# Patient Record
Sex: MALE | Race: WHITE | NOT HISPANIC OR LATINO | Employment: FULL TIME | URBAN - METROPOLITAN AREA
[De-identification: names, ages, dates, MRNs, and addresses within clinical notes are randomized per-mention and may not be internally consistent; named-entity substitution may affect disease eponyms.]

---

## 2023-08-01 ENCOUNTER — HOSPITAL ENCOUNTER (EMERGENCY)
Facility: HOSPITAL | Age: 33
Discharge: HOME | End: 2023-08-02
Attending: STUDENT IN AN ORGANIZED HEALTH CARE EDUCATION/TRAINING PROGRAM
Payer: COMMERCIAL

## 2023-08-01 DIAGNOSIS — R53.1 GENERALIZED WEAKNESS: Primary | ICD-10-CM

## 2023-08-01 DIAGNOSIS — R07.9 CHEST PAIN: ICD-10-CM

## 2023-08-01 LAB
ALBUMIN SERPL-MCNC: 4.4 G/DL (ref 3.5–5)
ALBUMIN/GLOB SERPL: 1.6 RATIO (ref 1.1–2)
ALP SERPL-CCNC: 63 UNIT/L (ref 40–150)
ALT SERPL-CCNC: 19 UNIT/L (ref 0–55)
APPEARANCE UR: CLEAR
AST SERPL-CCNC: 20 UNIT/L (ref 5–34)
BACTERIA #/AREA URNS AUTO: NORMAL /HPF
BASOPHILS # BLD AUTO: 0.05 X10(3)/MCL
BASOPHILS NFR BLD AUTO: 0.7 %
BILIRUB UR QL STRIP.AUTO: NEGATIVE
BILIRUBIN DIRECT+TOT PNL SERPL-MCNC: 0.8 MG/DL
BUN SERPL-MCNC: 12.8 MG/DL (ref 8.9–20.6)
CALCIUM SERPL-MCNC: 9.1 MG/DL (ref 8.4–10.2)
CHLORIDE SERPL-SCNC: 104 MMOL/L (ref 98–107)
CK SERPL-CCNC: 172 U/L (ref 30–200)
CO2 SERPL-SCNC: 24 MMOL/L (ref 22–29)
COLOR UR: YELLOW
CREAT SERPL-MCNC: 1.27 MG/DL (ref 0.73–1.18)
EOSINOPHIL # BLD AUTO: 0.1 X10(3)/MCL (ref 0–0.9)
EOSINOPHIL NFR BLD AUTO: 1.5 %
ERYTHROCYTE [DISTWIDTH] IN BLOOD BY AUTOMATED COUNT: 11.3 % (ref 11.5–17)
FLUAV AG UPPER RESP QL IA.RAPID: NOT DETECTED
FLUBV AG UPPER RESP QL IA.RAPID: NOT DETECTED
GFR SERPLBLD CREATININE-BSD FMLA CKD-EPI: >60 MLS/MIN/1.73/M2
GLOBULIN SER-MCNC: 2.7 GM/DL (ref 2.4–3.5)
GLUCOSE SERPL-MCNC: 165 MG/DL (ref 74–100)
GLUCOSE UR QL STRIP.AUTO: NEGATIVE
HCT VFR BLD AUTO: 43.5 % (ref 42–52)
HGB BLD-MCNC: 14.9 G/DL (ref 14–18)
IMM GRANULOCYTES # BLD AUTO: 0.01 X10(3)/MCL (ref 0–0.04)
IMM GRANULOCYTES NFR BLD AUTO: 0.1 %
KETONES UR QL STRIP.AUTO: NEGATIVE
LEUKOCYTE ESTERASE UR QL STRIP.AUTO: NEGATIVE
LYMPHOCYTES # BLD AUTO: 2.92 X10(3)/MCL (ref 0.6–4.6)
LYMPHOCYTES NFR BLD AUTO: 42.4 %
MCH RBC QN AUTO: 30.8 PG (ref 27–31)
MCHC RBC AUTO-ENTMCNC: 34.3 G/DL (ref 33–36)
MCV RBC AUTO: 89.9 FL (ref 80–94)
MONOCYTES # BLD AUTO: 0.56 X10(3)/MCL (ref 0.1–1.3)
MONOCYTES NFR BLD AUTO: 8.1 %
NEUTROPHILS # BLD AUTO: 3.24 X10(3)/MCL (ref 2.1–9.2)
NEUTROPHILS NFR BLD AUTO: 47.2 %
NITRITE UR QL STRIP.AUTO: NEGATIVE
NRBC BLD AUTO-RTO: 0 %
PH UR STRIP.AUTO: 6 [PH]
PLATELET # BLD AUTO: 223 X10(3)/MCL (ref 130–400)
PMV BLD AUTO: 10 FL (ref 7.4–10.4)
POTASSIUM SERPL-SCNC: 4 MMOL/L (ref 3.5–5.1)
PROT SERPL-MCNC: 7.1 GM/DL (ref 6.4–8.3)
PROT UR QL STRIP.AUTO: NEGATIVE
RBC # BLD AUTO: 4.84 X10(6)/MCL (ref 4.7–6.1)
RBC #/AREA URNS AUTO: NORMAL /HPF
RBC UR QL AUTO: NEGATIVE
SARS-COV-2 RNA RESP QL NAA+PROBE: NOT DETECTED
SODIUM SERPL-SCNC: 138 MMOL/L (ref 136–145)
SP GR UR STRIP.AUTO: 1.01 (ref 1–1.03)
SQUAMOUS #/AREA URNS AUTO: NORMAL /HPF
TROPONIN I SERPL-MCNC: <0.01 NG/ML (ref 0–0.04)
TSH SERPL-ACNC: 1.08 UIU/ML (ref 0.35–4.94)
UROBILINOGEN UR STRIP-ACNC: 0.2
WBC # SPEC AUTO: 6.88 X10(3)/MCL (ref 4.5–11.5)
WBC #/AREA URNS AUTO: NORMAL /HPF

## 2023-08-01 PROCEDURE — 81001 URINALYSIS AUTO W/SCOPE: CPT | Performed by: PHYSICIAN ASSISTANT

## 2023-08-01 PROCEDURE — 63600175 PHARM REV CODE 636 W HCPCS

## 2023-08-01 PROCEDURE — 84484 ASSAY OF TROPONIN QUANT: CPT | Performed by: PHYSICIAN ASSISTANT

## 2023-08-01 PROCEDURE — 93010 ELECTROCARDIOGRAM REPORT: CPT | Mod: ,,, | Performed by: INTERNAL MEDICINE

## 2023-08-01 PROCEDURE — 93010 EKG 12-LEAD: ICD-10-PCS | Mod: ,,, | Performed by: INTERNAL MEDICINE

## 2023-08-01 PROCEDURE — 96361 HYDRATE IV INFUSION ADD-ON: CPT

## 2023-08-01 PROCEDURE — 93005 ELECTROCARDIOGRAM TRACING: CPT

## 2023-08-01 PROCEDURE — 96360 HYDRATION IV INFUSION INIT: CPT

## 2023-08-01 PROCEDURE — 0240U COVID/FLU A&B PCR: CPT | Performed by: PHYSICIAN ASSISTANT

## 2023-08-01 PROCEDURE — 84443 ASSAY THYROID STIM HORMONE: CPT

## 2023-08-01 PROCEDURE — 85025 COMPLETE CBC W/AUTO DIFF WBC: CPT | Performed by: PHYSICIAN ASSISTANT

## 2023-08-01 PROCEDURE — 80053 COMPREHEN METABOLIC PANEL: CPT | Performed by: PHYSICIAN ASSISTANT

## 2023-08-01 PROCEDURE — 82550 ASSAY OF CK (CPK): CPT

## 2023-08-01 PROCEDURE — 63600175 PHARM REV CODE 636 W HCPCS: Performed by: PHYSICIAN ASSISTANT

## 2023-08-01 PROCEDURE — 99285 EMERGENCY DEPT VISIT HI MDM: CPT | Mod: 25

## 2023-08-01 RX ADMIN — SODIUM CHLORIDE, POTASSIUM CHLORIDE, SODIUM LACTATE AND CALCIUM CHLORIDE 1000 ML: 600; 310; 30; 20 INJECTION, SOLUTION INTRAVENOUS at 09:08

## 2023-08-01 RX ADMIN — SODIUM CHLORIDE, POTASSIUM CHLORIDE, SODIUM LACTATE AND CALCIUM CHLORIDE 1000 ML: 600; 310; 30; 20 INJECTION, SOLUTION INTRAVENOUS at 11:08

## 2023-08-02 VITALS
HEART RATE: 60 BPM | TEMPERATURE: 98 F | SYSTOLIC BLOOD PRESSURE: 128 MMHG | BODY MASS INDEX: 25.73 KG/M2 | WEIGHT: 190 LBS | HEIGHT: 72 IN | RESPIRATION RATE: 15 BRPM | DIASTOLIC BLOOD PRESSURE: 90 MMHG | OXYGEN SATURATION: 95 %

## 2023-08-02 NOTE — FIRST PROVIDER EVALUATION
Medical screening examination initiated.  I have conducted a focused provider triage encounter, findings are as follows:    Brief history of present illness:  31 yo male presents to ED chest pain, dizziness, and near syncope intermittent for the past week. States saw pcp today and sent to ED for further evaluation.     Vitals:    08/01/23 2108   BP: (!) 144/81   BP Location: Left arm   Patient Position: Sitting   Pulse: 96   Resp: 16   Temp: 98.4 °F (36.9 °C)   TempSrc: Oral   SpO2: 97%   Weight: 86.2 kg (190 lb)   Height: 6' (1.829 m)       Pertinent physical exam:  Patient is awake and alert and oriented.  Ambulatory to triage.  In no acute distress.      Brief workup plan:  labs, EKG, cxr, IVF    Preliminary workup initiated; this workup will be continued and followed by the physician or advanced practice provider that is assigned to the patient when roomed.

## 2023-08-02 NOTE — ED PROVIDER NOTES
"Encounter Date: 8/1/2023       History     Chief Complaint   Patient presents with    Chest Pain     Seen PCP Dr. Levar Swenson today for s/o CP, dizziness and tunneled vision x1 week.     The patient is a 32 y.o. male with a history of hypertension who presents to the Emergency Department with a chief complaint of generalized weakness.  Patient states that symptoms started about 1 week ago.  Patient reports that he has been having intermittent episodes of dizziness and "tunnel vision".  Patient states that he intermittently has chest pain when he has these episodes.  He states that he sometimes feels like he is going to pass out.  Patient went to see his primary care provider today who sent him to get an outpatient EKG and recommended he get a calcium scan.  He was advised to come to the emergency department if anything worsened.  Patient does report that he experienced similar feelings a few months ago.  He states that he was diagnosed with hypertension at that time.  States the symptoms resolved after he was started on antihypertensive medications.  Patient states that the symptoms have not worsened but he wanted to get evaluation sooner than Monday.  Symptoms began 1 week ago and have been intermittent since onset. His pain is currently rated as a 0/10 in severity. The currently patient denies chest pain, shortness of breath, fever, or chills. He reports taking nothing prior to arrival with no relief of symptoms. No other reported symptoms at this time     The history is provided by the patient. No  was used.   Chest Pain  The current episode started several days ago. Chest pain occurs intermittently. The chest pain is resolved. At its most intense, the chest pain is at 4/10. The chest pain is currently at 0/10. The quality of the pain is described as aching. The pain does not radiate. Primary symptoms include dizziness. Pertinent negatives for primary symptoms include no fever, no " fatigue, no syncope, no shortness of breath, no cough, no wheezing, no abdominal pain, no nausea and no vomiting.   He describes the dizziness as faintness. The dizziness began more than 1 week ago. The dizziness has been unchanged since its onset. Dizziness is a new problem. Dizziness does not occur with blurred vision, tinnitus, nausea, vomiting, weakness or diaphoresis.   Pertinent negatives for associated symptoms include no diaphoresis and no weakness. He tried nothing for the symptoms. Risk factors include male gender.   His past medical history is significant for hypertension.   Pertinent negatives for past medical history include no aneurysm, no DVT, no recent injury, no rheumatic fever, no stimulant use, no strokes, no thyroid problem, no TIA and no valve disorder.     Review of patient's allergies indicates:  No Known Allergies  History reviewed. No pertinent past medical history.  History reviewed. No pertinent surgical history.  History reviewed. No pertinent family history.  Social History     Tobacco Use    Smoking status: Never    Smokeless tobacco: Never     Review of Systems   Constitutional:  Negative for appetite change, diaphoresis, fatigue and fever.   HENT:  Negative for congestion, sore throat and tinnitus.    Eyes:  Negative for blurred vision, photophobia and pain.   Respiratory:  Negative for cough, shortness of breath and wheezing.    Cardiovascular:  Positive for chest pain. Negative for syncope.   Gastrointestinal:  Negative for abdominal pain, nausea and vomiting.   Genitourinary:  Negative for dysuria, frequency and urgency.   Musculoskeletal:  Negative for back pain.   Skin:  Negative for rash.   Neurological:  Positive for dizziness and light-headedness. Negative for weakness.   Hematological:  Does not bruise/bleed easily.   All other systems reviewed and are negative.      Physical Exam     Initial Vitals [08/01/23 2108]   BP Pulse Resp Temp SpO2   (!) 144/81 96 16 98.4 °F (36.9  °C) 97 %      MAP       --         Physical Exam    Nursing note and vitals reviewed.  Constitutional: Vital signs are normal. He appears well-developed and well-nourished.   HENT:   Head: Normocephalic.   Right Ear: Hearing and tympanic membrane normal.   Left Ear: Hearing and tympanic membrane normal.   Mouth/Throat: Uvula is midline, oropharynx is clear and moist and mucous membranes are normal.   Cardiovascular:  Normal rate, regular rhythm, normal heart sounds and normal pulses.           Pulmonary/Chest: Effort normal and breath sounds normal.   Abdominal: Abdomen is soft. Bowel sounds are normal. There is no abdominal tenderness.   Musculoskeletal:      Cervical back: No spinous process tenderness or muscular tenderness.     Lymphadenopathy:     He has no cervical adenopathy.   Neurological: He is alert. GCS eye subscore is 4. GCS verbal subscore is 5. GCS motor subscore is 6.   Skin: Skin is warm, dry and intact. Capillary refill takes less than 2 seconds.         ED Course   Procedures  Labs Reviewed   COMPREHENSIVE METABOLIC PANEL - Abnormal; Notable for the following components:       Result Value    Glucose Level 165 (*)     Creatinine 1.27 (*)     All other components within normal limits   CBC WITH DIFFERENTIAL - Abnormal; Notable for the following components:    RDW 11.3 (*)     All other components within normal limits   URINALYSIS, REFLEX TO URINE CULTURE - Normal   TROPONIN I - Normal   COVID/FLU A&B PCR - Normal    Narrative:     The Xpert Xpress SARS-CoV-2/FLU/RSV plus is a rapid, multiplexed real-time PCR test intended for the simultaneous qualitative detection and differentiation of SARS-CoV-2, Influenza A, Influenza B, and respiratory syncytial virus (RSV) viral RNA in either nasopharyngeal swab or nasal swab specimens.         URINALYSIS, MICROSCOPIC - Normal   CK - Normal   TSH - Normal   CBC W/ AUTO DIFFERENTIAL    Narrative:     The following orders were created for panel order CBC auto  differential.  Procedure                               Abnormality         Status                     ---------                               -----------         ------                     CBC with Differential[[id number]]        Abnormal            Final result                 Please view results for these tests on the individual orders.        ECG Results              EKG 12-lead (Final result)  Result time 08/01/23 22:18:42      Final result by Interface, Lab In [facility] (08/01/23 22:18:42)                   Narrative:    Test Reason : R07.9,    Vent. Rate : 099 BPM     Atrial Rate : 099 BPM     P-R Int : 104 ms          QRS Dur : 094 ms      QT Int : 344 ms       P-R-T Axes : 028 042 081 degrees     QTc Int : 441 ms    Sinus rhythm with short ND  Otherwise normal ECG  No previous ECGs available  Confirmed by Serafin Cantrell MD ([id number]) on 8/1/2023 10:18:29 PM    Referred By:             Confirmed By:Serafin Cantrell MD                                  Imaging Results              CT Head Without Contrast (Preliminary result)  Result time 08/01/23 23:24:57      Preliminary result by Aram Mendoza Jr., MD (08/01/23 23:24:57)                   Narrative:    START OF REPORT:  TECHNIQUE: CT OF THE HEAD WAS PERFORMED WITHOUT INTRAVENOUS CONTRAST WITH AXIAL AS WELL AS CORONAL AND SAGITTAL IMAGES.    COMPARISON: NONE.    DOSAGE INFORMATION: AUTOMATED EXPOSURE CONTROL WAS UTILIZED.    CLINICAL HISTORY: (SEEN PCP DR. VALERIA ARMENTA TODAY FOR S/O CP, DIZZINESS AND TUNNELED VISION X1 WEEK.).    Findings:  Hemorrhage: No acute intracranial hemorrhage is seen.  CSF spaces: The ventricles sulci and basal cisterns are within normal limits.  Brain parenchyma: Unremarkable with preservation of the grey white junction throughout.  Cerebellum: Unremarkable.  Sella and skull base: The sella appears to be within normal limits for age.  Herniation: None.  Intracranial calcifications: Incidental note is made of some pineal region  "calcification.  Calvarium: No acute linear or depressed skull fracture is seen.    Maxillofacial Structures:  Paranasal sinuses: The visualized paranasal sinuses appear clear with no mucoperiosteal thickening or air fluid levels identified.  Orbits: The orbits appear unremarkable.  Zygomatic arches: The zygomatic arches are intact and unremarkable.  Temporal bones and mastoids: The temporal bones and mastoids appear unremarkable.  TMJ: The mandibular condyles appear normally placed with respect to the mandibular fossa.      Impression:  1. No acute intracranial process identified. Details and other findings as noted above.                                         X-Ray Chest PA And Lateral (Final result)  Result time 08/01/23 21:27:35      Final result by Johan Sumner MD (08/01/23 21:27:35)                   Impression:      No acute cardiopulmonary process.      Electronically signed by: Johan Sumner  Date:    08/01/2023  Time:    21:27               Narrative:    EXAMINATION:  XR CHEST PA AND LATERAL    CLINICAL HISTORY:  Chest pain, unspecified    TECHNIQUE:  Two views of the chest    COMPARISON:  01/31/2016    FINDINGS:  No focal opacification, pleural effusion, or pneumothorax.    The cardiomediastinal silhouette is within normal limits.    No acute osseous abnormality.                                       Medications   lactated ringers bolus 1,000 mL (0 mLs Intravenous Stopped 8/1/23 2233)   lactated ringers bolus 1,000 mL (0 mLs Intravenous Stopped 8/1/23 2352)     Medical Decision Making:   Initial Assessment:   The patient is a 32 y.o. male with a history of hypertension who presents to the Emergency Department with a chief complaint of generalized weakness.  Patient states that symptoms started about 1 week ago.  Patient reports that he has been having intermittent episodes of dizziness and "tunnel vision".  Patient states that he intermittently has chest pain when he has these episodes.  He states " that he sometimes feels like he is going to pass out.  Patient went to see his primary care provider today who sent him to get an outpatient EKG and recommended he get a calcium scan.  He was advised to come to the emergency department if anything worsened.  Patient does report that he experienced similar feelings a few months ago.  He states that he was diagnosed with hypertension at that time.  States the symptoms resolved after he was started on antihypertensive medications.  Patient states that the symptoms have not worsened but he wanted to get evaluation sooner than Monday.  Symptoms began 1 week ago and have been intermittent since onset. His pain is currently rated as a 0/10 in severity. The currently patient denies chest pain, shortness of breath, fever, or chills. He reports taking nothing prior to arrival with no relief of symptoms. No other reported symptoms at this time   Differential Diagnosis:   Differential diagnoses include but are not limited to electrolyte derangement, dehydration, ACS, cardiac arrhythmia, CVA, thyroid disease  Clinical Tests:   Lab Tests: Ordered and Reviewed  Radiological Study: Ordered and Reviewed  Medical Tests: Ordered and Reviewed  ED Management:  MDM  History obtained by patient.   Co-morbidities and/or factors adding to the complexity or risk for the patient?: none  Differential diagnoses: Differential diagnoses include but are not limited to electrolyte derangement, dehydration, ACS, cardiac arrhythmia, CVA, thyroid disease  Decision to obtain previous or outside records?: no  Chart Review (nursing home, outside records, CareEverywhere): none  Review of RX medications/new RX prescribed by me?: none  My EKG Interpretation: see above  Labs/imaging/other tests obtained/considered (risk/benefits of testing discussed):  CBC, CMP, troponin, TSH, UA, CK  Labs/tests intepretation:  Creatinine 1.27, otherwise labs unremarkable; CT head with no acute findings; x-ray chest with no  acute findings  My independent imaging interpretation: none  Treatment/interventions, IV fluids, IV medications: ivfs  Complex management (IV controlled substances, went to OR, DNR, meds requiring monitoring, transfer, etc)?: none  Workup/treatment affected by social determinants of health?: none   Point of care US done/interpretation: none  Consults/radiologist/EMS/social work/family discussion/alternate history: none  Advanced care planning/end of life discussion: none  Shared decision making: shared decision making with patient.   ETOH/smoking/drug cessation discussion: none  Dispo: discharge home.  I have discussed results including follow up with the patient in great detail.  Patient is feeling better after IV fluids.  Lab work and tests in ER today are unremarkable.  I did send a referral to [facility].  Patient is also undergoing outpatient workup by his primary care provider.  He was given strict ER return precautions.  He is amenable to plan and ready for discharge home.             ED Course as of 08/02/23 0047   Tue Aug 01, 2023   2342 Troponin I: <0.010 [LM]   2342 CPK: 172 [LM]   2342 Thyroid Stimulating Hormone: 1.078 [LM]      ED Course User Index  [LM] Misa Melendez NP                 Clinical Impression:   Final diagnoses:  [R07.9] Chest pain  [R53.1] Generalized weakness (Primary)        ED Disposition Condition    Discharge Stable          ED Prescriptions    None       Follow-up Information       Follow up With Specialties Details Why Contact Info    [facility] Cardi[facility], Cardiology Schedule an appointment as soon as possible for a visit   [address] [facility] [id number] [phone]      [facility] Cardi[facility] Disease, Cardiology Schedule an appointment as soon as possible for a visit   [address] [facility] [id number] [phone]               Misa  RADHA Melendez NP  08/02/23 0047

## 2024-09-10 ENCOUNTER — HOSPITAL ENCOUNTER (EMERGENCY)
Facility: HOSPITAL | Age: 34
Discharge: LEFT BEFORE DISCHARGE | End: 2024-09-10
Attending: STUDENT IN AN ORGANIZED HEALTH CARE EDUCATION/TRAINING PROGRAM
Payer: COMMERCIAL

## 2024-09-10 VITALS
WEIGHT: 190 LBS | TEMPERATURE: 97 F | BODY MASS INDEX: 25.73 KG/M2 | RESPIRATION RATE: 20 BRPM | DIASTOLIC BLOOD PRESSURE: 66 MMHG | SYSTOLIC BLOOD PRESSURE: 96 MMHG | HEIGHT: 72 IN | OXYGEN SATURATION: 98 % | HEART RATE: 69 BPM

## 2024-09-10 DIAGNOSIS — I48.91 ATRIAL FIBRILLATION, UNSPECIFIED TYPE: ICD-10-CM

## 2024-09-10 DIAGNOSIS — R07.9 CHEST PAIN: Primary | ICD-10-CM

## 2024-09-10 DIAGNOSIS — R07.9 CHEST PAIN, UNSPECIFIED TYPE: ICD-10-CM

## 2024-09-10 DIAGNOSIS — R00.0 WIDE-COMPLEX TACHYCARDIA: ICD-10-CM

## 2024-09-10 LAB
ALBUMIN SERPL-MCNC: 4 G/DL (ref 3.5–5)
ALBUMIN/GLOB SERPL: 1.4 RATIO (ref 1.1–2)
ALP SERPL-CCNC: 58 UNIT/L (ref 40–150)
ALT SERPL-CCNC: 46 UNIT/L (ref 0–55)
ANION GAP SERPL CALC-SCNC: 13 MEQ/L
APTT PPP: 27.1 SECONDS (ref 23.2–33.7)
AST SERPL-CCNC: 49 UNIT/L (ref 5–34)
BASOPHILS # BLD AUTO: 0.09 X10(3)/MCL
BASOPHILS NFR BLD AUTO: 0.5 %
BILIRUB SERPL-MCNC: 0.9 MG/DL
BUN SERPL-MCNC: 25.5 MG/DL (ref 8.9–20.6)
CALCIUM SERPL-MCNC: 9.4 MG/DL (ref 8.4–10.2)
CHLORIDE SERPL-SCNC: 104 MMOL/L (ref 98–107)
CK SERPL-CCNC: 204 U/L (ref 30–200)
CO2 SERPL-SCNC: 22 MMOL/L (ref 22–29)
CREAT SERPL-MCNC: 2.79 MG/DL (ref 0.73–1.18)
CREAT/UREA NIT SERPL: 9
EOSINOPHIL # BLD AUTO: 0.07 X10(3)/MCL (ref 0–0.9)
EOSINOPHIL NFR BLD AUTO: 0.4 %
ERYTHROCYTE [DISTWIDTH] IN BLOOD BY AUTOMATED COUNT: 11.4 % (ref 11.5–17)
ETHANOL SERPL-MCNC: <10 MG/DL
FLUAV AG UPPER RESP QL IA.RAPID: NOT DETECTED
FLUBV AG UPPER RESP QL IA.RAPID: NOT DETECTED
GFR SERPLBLD CREATININE-BSD FMLA CKD-EPI: 30 ML/MIN/1.73/M2
GLOBULIN SER-MCNC: 2.9 GM/DL (ref 2.4–3.5)
GLUCOSE SERPL-MCNC: 118 MG/DL (ref 74–100)
HCT VFR BLD AUTO: 46.8 % (ref 42–52)
HGB BLD-MCNC: 16 G/DL (ref 14–18)
IMM GRANULOCYTES # BLD AUTO: 0.15 X10(3)/MCL (ref 0–0.04)
IMM GRANULOCYTES NFR BLD AUTO: 0.9 %
INR PPP: 1
LACTATE SERPL-SCNC: 3.4 MMOL/L (ref 0.5–2.2)
LYMPHOCYTES # BLD AUTO: 4.4 X10(3)/MCL (ref 0.6–4.6)
LYMPHOCYTES NFR BLD AUTO: 26.7 %
MAGNESIUM SERPL-MCNC: 2.6 MG/DL (ref 1.6–2.6)
MCH RBC QN AUTO: 31.8 PG (ref 27–31)
MCHC RBC AUTO-ENTMCNC: 34.2 G/DL (ref 33–36)
MCV RBC AUTO: 93 FL (ref 80–94)
MONOCYTES # BLD AUTO: 1.08 X10(3)/MCL (ref 0.1–1.3)
MONOCYTES NFR BLD AUTO: 6.6 %
NEUTROPHILS # BLD AUTO: 10.67 X10(3)/MCL (ref 2.1–9.2)
NEUTROPHILS NFR BLD AUTO: 64.9 %
NRBC BLD AUTO-RTO: 0 %
OHS QRS DURATION: 102 MS
OHS QRS DURATION: 130 MS
OHS QTC CALCULATION: 403 MS
OHS QTC CALCULATION: 525 MS
PLATELET # BLD AUTO: 280 X10(3)/MCL (ref 130–400)
PMV BLD AUTO: 10.1 FL (ref 7.4–10.4)
POTASSIUM SERPL-SCNC: 4.8 MMOL/L (ref 3.5–5.1)
PROT SERPL-MCNC: 6.9 GM/DL (ref 6.4–8.3)
PROTHROMBIN TIME: 13.1 SECONDS (ref 12.5–14.5)
RBC # BLD AUTO: 5.03 X10(6)/MCL (ref 4.7–6.1)
RSV A 5' UTR RNA NPH QL NAA+PROBE: NOT DETECTED
SARS-COV-2 RNA RESP QL NAA+PROBE: NOT DETECTED
SODIUM SERPL-SCNC: 139 MMOL/L (ref 136–145)
TROPONIN I SERPL-MCNC: 0.06 NG/ML (ref 0–0.04)
TSH SERPL-ACNC: 3.05 UIU/ML (ref 0.35–4.94)
WBC # BLD AUTO: 16.46 X10(3)/MCL (ref 4.5–11.5)

## 2024-09-10 PROCEDURE — 93005 ELECTROCARDIOGRAM TRACING: CPT

## 2024-09-10 PROCEDURE — 63600175 PHARM REV CODE 636 W HCPCS

## 2024-09-10 PROCEDURE — 84484 ASSAY OF TROPONIN QUANT: CPT | Performed by: STUDENT IN AN ORGANIZED HEALTH CARE EDUCATION/TRAINING PROGRAM

## 2024-09-10 PROCEDURE — 25000003 PHARM REV CODE 250

## 2024-09-10 PROCEDURE — 82550 ASSAY OF CK (CPK): CPT | Performed by: STUDENT IN AN ORGANIZED HEALTH CARE EDUCATION/TRAINING PROGRAM

## 2024-09-10 PROCEDURE — 80053 COMPREHEN METABOLIC PANEL: CPT | Performed by: STUDENT IN AN ORGANIZED HEALTH CARE EDUCATION/TRAINING PROGRAM

## 2024-09-10 PROCEDURE — 84443 ASSAY THYROID STIM HORMONE: CPT | Performed by: STUDENT IN AN ORGANIZED HEALTH CARE EDUCATION/TRAINING PROGRAM

## 2024-09-10 PROCEDURE — 83605 ASSAY OF LACTIC ACID: CPT | Performed by: STUDENT IN AN ORGANIZED HEALTH CARE EDUCATION/TRAINING PROGRAM

## 2024-09-10 PROCEDURE — 63600175 PHARM REV CODE 636 W HCPCS: Performed by: STUDENT IN AN ORGANIZED HEALTH CARE EDUCATION/TRAINING PROGRAM

## 2024-09-10 PROCEDURE — 96365 THER/PROPH/DIAG IV INF INIT: CPT

## 2024-09-10 PROCEDURE — 93010 ELECTROCARDIOGRAM REPORT: CPT | Mod: 76,,, | Performed by: INTERNAL MEDICINE

## 2024-09-10 PROCEDURE — 85025 COMPLETE CBC W/AUTO DIFF WBC: CPT | Performed by: STUDENT IN AN ORGANIZED HEALTH CARE EDUCATION/TRAINING PROGRAM

## 2024-09-10 PROCEDURE — 85730 THROMBOPLASTIN TIME PARTIAL: CPT | Performed by: STUDENT IN AN ORGANIZED HEALTH CARE EDUCATION/TRAINING PROGRAM

## 2024-09-10 PROCEDURE — 96361 HYDRATE IV INFUSION ADD-ON: CPT

## 2024-09-10 PROCEDURE — 82077 ASSAY SPEC XCP UR&BREATH IA: CPT | Performed by: STUDENT IN AN ORGANIZED HEALTH CARE EDUCATION/TRAINING PROGRAM

## 2024-09-10 PROCEDURE — 25500020 PHARM REV CODE 255: Performed by: STUDENT IN AN ORGANIZED HEALTH CARE EDUCATION/TRAINING PROGRAM

## 2024-09-10 PROCEDURE — 25000003 PHARM REV CODE 250: Performed by: STUDENT IN AN ORGANIZED HEALTH CARE EDUCATION/TRAINING PROGRAM

## 2024-09-10 PROCEDURE — 85610 PROTHROMBIN TIME: CPT | Performed by: STUDENT IN AN ORGANIZED HEALTH CARE EDUCATION/TRAINING PROGRAM

## 2024-09-10 PROCEDURE — 83735 ASSAY OF MAGNESIUM: CPT | Performed by: STUDENT IN AN ORGANIZED HEALTH CARE EDUCATION/TRAINING PROGRAM

## 2024-09-10 PROCEDURE — 0241U COVID/RSV/FLU A&B PCR: CPT | Performed by: STUDENT IN AN ORGANIZED HEALTH CARE EDUCATION/TRAINING PROGRAM

## 2024-09-10 PROCEDURE — 99291 CRITICAL CARE FIRST HOUR: CPT | Mod: 25

## 2024-09-10 RX ORDER — ADENOSINE 3 MG/ML
INJECTION, SOLUTION INTRAVENOUS
Status: DISCONTINUED
Start: 2024-09-10 | End: 2024-09-10 | Stop reason: WASHOUT

## 2024-09-10 RX ORDER — MAGNESIUM SULFATE HEPTAHYDRATE 40 MG/ML
INJECTION, SOLUTION INTRAVENOUS
Status: COMPLETED
Start: 2024-09-10 | End: 2024-09-10

## 2024-09-10 RX ORDER — NAPROXEN SODIUM 220 MG/1
324 TABLET, FILM COATED ORAL
Status: COMPLETED | OUTPATIENT
Start: 2024-09-10 | End: 2024-09-10

## 2024-09-10 RX ORDER — NAPROXEN SODIUM 220 MG/1
TABLET, FILM COATED ORAL
Status: COMPLETED
Start: 2024-09-10 | End: 2024-09-10

## 2024-09-10 RX ORDER — FENTANYL CITRATE 50 UG/ML
INJECTION, SOLUTION INTRAMUSCULAR; INTRAVENOUS
Status: DISCONTINUED
Start: 2024-09-10 | End: 2024-09-10 | Stop reason: WASHOUT

## 2024-09-10 RX ORDER — MAGNESIUM SULFATE HEPTAHYDRATE 40 MG/ML
2 INJECTION, SOLUTION INTRAVENOUS
Status: COMPLETED | OUTPATIENT
Start: 2024-09-10 | End: 2024-09-10

## 2024-09-10 RX ORDER — LORAZEPAM 2 MG/ML
INJECTION INTRAMUSCULAR
Status: DISCONTINUED
Start: 2024-09-10 | End: 2024-09-10 | Stop reason: WASHOUT

## 2024-09-10 RX ORDER — NAPROXEN SODIUM 220 MG/1
324 TABLET, FILM COATED ORAL DAILY
Status: DISCONTINUED | OUTPATIENT
Start: 2024-09-10 | End: 2024-09-10

## 2024-09-10 RX ADMIN — SODIUM CHLORIDE 1000 ML: 9 INJECTION, SOLUTION INTRAVENOUS at 01:09

## 2024-09-10 RX ADMIN — ASPIRIN 81 MG 324 MG: 81 TABLET ORAL at 02:09

## 2024-09-10 RX ADMIN — SODIUM CHLORIDE, POTASSIUM CHLORIDE, SODIUM LACTATE AND CALCIUM CHLORIDE 1000 ML: 600; 310; 30; 20 INJECTION, SOLUTION INTRAVENOUS at 02:09

## 2024-09-10 RX ADMIN — NAPROXEN SODIUM 324 MG: 220 TABLET, FILM COATED ORAL at 02:09

## 2024-09-10 RX ADMIN — MAGNESIUM SULFATE HEPTAHYDRATE 2 G: 40 INJECTION, SOLUTION INTRAVENOUS at 01:09

## 2024-09-10 RX ADMIN — IOHEXOL 75 ML: 350 INJECTION, SOLUTION INTRAVENOUS at 02:09

## 2024-09-10 NOTE — DISCHARGE INSTRUCTIONS
Thanks for letting us take care of you today!  It is our goal to give you courteous care and to keep you comfortable and informed, if you have any questions before you leave I will be happy to try and answer them.    Here is some advice after your visit:    Your visit in the emergency department is NOT definitive care - please follow-up with your primary care doctor and/or specialist within 1-2 days. Please return to the emergency department if you develop worsening symptoms including: fever, chills, chest pain, shortness of breath, weakness, numbness, tingling, nausea, vomiting, inability to eat, drink, or take your medication. Please return if you have any worsening in your condition or if you have any other concerns.    If you had radiology exams like an XRAY or CT in the emergency Department the interpreation on them may be preliminary - there may be less time sensitive findings on the reports please obtain these reports within 24 hours from the hospital or by using your out on your mobile phone to access records.  Bring these to your primary care doctor and/or specialist for further review of incidental findings.    Please review any LAB WORK from your visit today with your primary care physician.    You are welcome to return emergency department any time for any worsening symptoms.  In fact I strongly encouraged it.    Please be sure to follow up with the Cardiology.

## 2024-09-10 NOTE — ED PROVIDER NOTES
Encounter Date: 9/10/2024       History     Chief Complaint   Patient presents with    Chest Pain     Hx PoTS. C/O chest pain, palpitations, abd pain, neck pain, SOB, vomiting x 4 - onset 6 hours ago.      It was a 33-year-old male with a port medical history of POTS presents emergency department chief complaint of chest pain, palpitations.  Patient reports symptoms began proximally 7:00 p.m. he was working in the yd began to feel lightheaded.  Reports he was he was common when he has a POTS episode than normally he was able to lie down it goes away however did not get better.  He did take his night meds including baclofen for his neck pain, in his metoprolol with a he was done at help.  He was sent in the emergency department for further evaluation.              Review of patient's allergies indicates:  No Known Allergies  History reviewed. No pertinent past medical history.  History reviewed. No pertinent surgical history.  No family history on file.  Social History     Tobacco Use    Smoking status: Never    Smokeless tobacco: Never     Review of Systems   Constitutional:  Negative for chills and fever.   Respiratory:  Positive for chest tightness and shortness of breath.    Cardiovascular:  Positive for chest pain and palpitations.   Gastrointestinal:  Negative for abdominal pain.       Physical Exam     Initial Vitals   BP Pulse Resp Temp SpO2   09/10/24 0100 09/10/24 0042 09/10/24 0042 09/10/24 0042 09/10/24 0042   92/60 (!) 190 (!) 26 97.2 °F (36.2 °C) (!) 93 %      MAP       --                Physical Exam    Constitutional: He appears well-developed and well-nourished. He is diaphoretic. He appears distressed (uncomfortable appearing).   HENT:   Head: Normocephalic and atraumatic.   Right Ear: External ear normal.   Left Ear: External ear normal.   Nose: Nose normal.   Eyes: EOM are normal. Pupils are equal, round, and reactive to light. Right eye exhibits no discharge. Left eye exhibits no discharge.    Cardiovascular:  Regular rhythm and normal heart sounds.     Exam reveals no gallop and no friction rub.       No murmur heard.  Tachycardic  +radial pulses bilaterally   Pulmonary/Chest: Effort normal and breath sounds normal. No respiratory distress. He has no wheezes. He has no rhonchi. He has no rales. He exhibits no tenderness.   Abdominal: Abdomen is soft. Bowel sounds are normal. He exhibits no distension and no mass. There is no abdominal tenderness. There is no rebound and no guarding.   Musculoskeletal:         General: No edema. Normal range of motion.     Neurological: He is alert and oriented to person, place, and time. No cranial nerve deficit or sensory deficit.   Skin: Skin is warm. Capillary refill takes less than 2 seconds. There is pallor.         ED Course   Critical Care    Date/Time: 9/10/2024 1:53 AM    Performed by: David Stuart MD  Authorized by: David Stuart MD  Direct patient critical care time: 16 minutes  Additional history critical care time: 13 minutes  Ordering / reviewing critical care time: 10 minutes  Documentation critical care time: 12 minutes  Consulting other physicians critical care time: 11 minutes  Total critical care time (exclusive of procedural time) : 62 minutes  Critical care time was exclusive of separately billable procedures and treating other patients.  Critical care was necessary to treat or prevent imminent or life-threatening deterioration of the following conditions: circulatory failure, cardiac failure and renal failure.  Critical care was time spent personally by me on the following activities: development of treatment plan with patient or surrogate, discussions with consultants, interpretation of cardiac output measurements, evaluation of patient's response to treatment, examination of patient, obtaining history from patient or surrogate, ordering and performing treatments and interventions, ordering and review of laboratory studies, ordering  and review of radiographic studies, pulse oximetry, re-evaluation of patient's condition and review of old charts.        Labs Reviewed   COMPREHENSIVE METABOLIC PANEL - Abnormal       Result Value    Sodium 139      Potassium 4.8      Chloride 104      CO2 22      Glucose 118 (*)     Blood Urea Nitrogen 25.5 (*)     Creatinine 2.79 (*)     Calcium 9.4      Protein Total 6.9      Albumin 4.0      Globulin 2.9      Albumin/Globulin Ratio 1.4      Bilirubin Total 0.9      ALP 58      ALT 46      AST 49 (*)     eGFR 30      Anion Gap 13.0      BUN/Creatinine Ratio 9     TROPONIN I - Abnormal    Troponin-I 0.063 (*)    LACTIC ACID, PLASMA - Abnormal    Lactic Acid Level 3.4 (*)    CBC WITH DIFFERENTIAL - Abnormal    WBC 16.46 (*)     RBC 5.03      Hgb 16.0      Hct 46.8      MCV 93.0      MCH 31.8 (*)     MCHC 34.2      RDW 11.4 (*)     Platelet 280      MPV 10.1      Neut % 64.9      Lymph % 26.7      Mono % 6.6      Eos % 0.4      Basophil % 0.5      Lymph # 4.40      Neut # 10.67 (*)     Mono # 1.08      Eos # 0.07      Baso # 0.09      IG# 0.15 (*)     IG% 0.9      NRBC% 0.0     CK - Abnormal    Creatine Kinase 204 (*)    APTT - Normal    PTT 27.1     PROTIME-INR - Normal    PT 13.1      INR 1.0     MAGNESIUM - Normal    Magnesium Level 2.60     TSH - Normal    TSH 3.050     COVID/RSV/FLU A&B PCR - Normal    Influenza A PCR Not Detected      Influenza B PCR Not Detected      Respiratory Syncytial Virus PCR Not Detected      SARS-CoV-2 PCR Not Detected      Narrative:     The Xpert Xpress SARS-CoV-2/FLU/RSV plus is a rapid, multiplexed real-time PCR test intended for the simultaneous qualitative detection and differentiation of SARS-CoV-2, Influenza A, Influenza B, and respiratory syncytial virus (RSV) viral RNA in either nasopharyngeal swab or nasal swab specimens.         ALCOHOL,MEDICAL (ETHANOL) - Normal    Ethanol Level <10.0     CBC W/ AUTO DIFFERENTIAL    Narrative:     The following orders were created for  panel order CBC auto differential.  Procedure                               Abnormality         Status                     ---------                               -----------         ------                     CBC with Differential[[id number]]       Abnormal            Final result                 Please view results for these tests on the individual orders.   LACTIC ACID, PLASMA          Imaging Results              CTA Chest Non-Coronary (PE Studies) (Final result)  Result time 09/10/24 04:37:27      Final result by Andre Figueroa MD (09/10/24 04:37:27)                   Impression:      1. No evidence of pulmonary embolism through the level of the subsegmental pulmonary arteries.  2. Minimal left lower lobe subsegmental atelectasis.  [facility] concordance      Electronically signed by: Andre Figueroa MD  Date:    09/10/2024  Time:    04:37               Narrative:    EXAMINATION:  CTA CHEST NON CORONARY (PE STUDIES)    CLINICAL HISTORY:  Pulmonary embolism (PE) suspected, high prob;    TECHNIQUE:  Axial images of the chest were obtained with IV contrast administration.  Coronal and sagittal reconstructions were provided.  Three dimensional and MIP images were obtained and evaluated.  Total DLP was 368 mGy-cm. Dose lowering technique and automated exposure control were utilized for this exam.    COMPARISON:  Chest radiograph 09/10/2024.    FINDINGS:  There is no filling defect within the pulmonary arteries through the level of the subsegmental pulmonary arteries.  There is no CT evidence of right heart strain.    The airway is widely patent.  There is no mediastinal or hilar lymphadenopathy.  The heart is not enlarged.    There is minimal left lower lobe atelectasis.  The lung parenchyma is otherwise clear.  There is no pulmonary nodule or mass.  There is no pleural effusion.  There is no ground-glass or reticulonodular airspace opacity.    The upper abdomen demonstrates no acute abnormality.  There is no lytic or  blastic osseous lesion.                        Preliminary result by Alex Jackson MD (09/10/24 03:19:40)                   Impression:    1. No filling defects are seen in the pulmonary arteries to suggest pulmonary embolus.  2. No acute focal infiltrate or consolidation is seen.  3. No acute intrathoracic process identified. Details and other findings as discussed above.               Narrative:    START OF REPORT:  Technique: CT Scan of the chest was performed with intravenous contrast with direct axial images as well as sagittal and coronal reconstruction images pulmonary embolus protocol.    Dosage Information: Automated Exposure Control was utilized.    Comparison: None.    Clinical History: Hx PoTS. C/O chest pain, palpitations, abd pain, neck pain, SOB, vomiting x 4 - onset 6 hours ago.    Findings:  Neck: The visualized soft tissues of the neck appear unremarkable.  Mediastinum: The mediastinal structures are within normal limits.  Heart: The heart appears unremarkable.  Aorta: Unremarkable appearing aorta. No aortic dissection or aneurysm is seen.  Pulmonary Arteries: No filling defects are seen in the pulmonary arteries to suggest pulmonary embolus.  Lungs: There is moderate non specific dependent change at the lung bases. No acute focal infiltrate or consolidation is seen.  Pleura: No effusions or pneumothorax are identified.  Bony Structures: The visualized bony structures appear unremarkable.  Ribs: The visualized bilateral ribs appear unremarkable.  Abdomen: The visualized upper abdominal organs appear unremarkable.                                         X-Ray Chest AP Portable (Final result)  Result time 09/10/24 01:41:44      Final result by Rick Funes MD (09/10/24 01:41:44)                   Impression:      No acute cardiopulmonary process identified.      Electronically signed by: Rick Funes  Date:    09/10/2024  Time:    01:41               Narrative:    EXAMINATION:  XR CHEST AP  PORTABLE    CLINICAL HISTORY:  chest pain;    TECHNIQUE:  One view    COMPARISON:  August 1, 2023    FINDINGS:  Cardiopericardial silhouette is within normal limits.  Lungs hypoventilatory changes without dense focal or segmental consolidation, congestive process, pleural effusions or pneumothorax.  Partially visualized ACDF.                                       Medications   sodium chloride 0.9% bolus 1,000 mL 1,000 mL (0 mLs Intravenous Stopped 9/10/24 0202)   magnesium sulfate 2g in water 50mL IVPB (premix) (0 g Intravenous Stopped 9/10/24 0215)   aspirin chewable tablet 324 mg (324 mg Oral Given 9/10/24 0215)   lactated ringers bolus 1,000 mL (0 mLs Intravenous Stopped 9/10/24 0315)   iohexoL (OMNIPAQUE 350) injection 75 mL (75 mLs Intravenous Given 9/10/24 0252)     Medical Decision Making  Differential diagnosis to include but not limited to cardiac arrhythmia, wide complex tachycardia, AFib with a aberrancy, atrial flutter, electrolyte abnormality, renal dysfunction, pulmonary emboli    Patient was arrives ill-appearing.  Tachycardic with rate in the 180s.  He was spontaneous he was converted in the room to a rate controlled atrial fibrillation which appears to be new.  Later he was stay he converted to a sinus rhythm similar to his baseline.  He was blood pressure improves with fluid resuscitation.  He was administered magnesium here in the emergency department.  He was troponin is elevated.  He was creatinine is elevated as well.  Plan was to admit on heparin for further evaluation management given his bizarre wide complex tachycardic earlier today however patient declines.  He understands that he may have worsening kidney function, worsening cardiac function, he was suffered death from a cardiac arrhythmia however he was still like to leave.  He was GCS 15.  Awake alert oriented, has capacity.  He will leave against medical advice.  He was made aware that he was welcome to return emergency department any  time for any worsening symptoms.  Have contacted Cardiology to arrange close follow up with the patient.  He does wish to establish care with Cardiology locally instead of [place].        Amount and/or Complexity of Data Reviewed  External Data Reviewed: notes.     Details: See ED course  Labs: ordered. Decision-making details documented in ED Course.  Radiology: ordered and independent interpretation performed. Decision-making details documented in ED Course.  ECG/medicine tests: ordered and independent interpretation performed. Decision-making details documented in ED Course.    Risk  OTC drugs.  Prescription drug management.               ED Course as of 09/10/24 0518   Tue Sep 10, 2024   0101 Upon arrival emergency department patient was tachycardic with a wide complex QRS on the EKG rate in the 180s.  Hypotensive ill-appearing.  Spontaneously converted to AFib while in the room. [MM]   0107 EKG done at 12:41 a.m. shows a wide complex tachycardia rate of 187.  Prolonged QTC of 525.  Right axis deviation.  High voltage QRS complexes.  Abnormal EKG. [MM]   0107 Repeat EKG done at 12:58 a.m. after patient spontaneous conversion shows AFib rate of 79, .  Some diffuse ST depressions no obvious elevation.  Possible subendocardial injury [MM]   0108 Pads were placed on patient.  While we were awaiting sedation for synchronized cardioversion he was spontaneously converted to an atrial fibrillation rate controlled. [MM]   0118 There is a note from patient's PCP from 07/18/24 from patient was PCP.  The visit was establish care.  History of ADHD, POTS, cervical spondylolysis.  Currently taking baclofen, Celebrex, Norco, hydroxyzine, metoprolol succinate 25 mg.  Currently on Vyvanse, Adderall.    Seen by cardiology 9/15 in [place] and scheduled for outpatient workup.  That has a note from interventional cardiology 09/28/2023 that showed results of his echo showed normal LV ejection fraction 60 presents with  no evidence of structural heart defect.  Normal valve function.  Tilt-table test did recreate some symptoms but the associated transient drop his blood pressure.  He was placed on metoprolol succinate with some help of his symptoms.  Ultimately diagnosed with POTS, on metoprolol succinate XL (TOPROL-XL) 25 mg 24 hr tablet; Take 1 tablet by mouth in the morning and 1 tablet before bedtime.    Also follow up by rheumatology for general disc disease of the C-spine ACDF of C5-C6 2021 [MM]   0126 Spoke with Andre, on-call for [facility].  He was reviewed EKGs.  Recommended giving Mag, aspirin, called back whenever labs are back.  Did not recommended amiodarone at this time. [MM]   0145 CBC auto differential(!)  Leukocytosis with left shift.  No anemia. [MM]   0146 X-Ray Chest AP Portable  Concern for possible cardiomegaly with some congestive changes [MM]   0203 INR: 1.0 [MM]   0204 PTT: 27.1 [MM]   0204 Magnesium : 2.60 [MM]   0204 Alcohol, Serum: <10.0 [MM]   0230 Troponin I(!): 0.063 [MM]   0230 TSH: 3.050 [MM]   0516 Spoke with Andre with [facility] several times during stay.  Initially recommended heparin, admission, Nephrology consultation did not recommend amiodarone unfortunately patient has left against medical advice. [MM]      ED Course User Index  [MM] David Stuart MD                           Clinical Impression:  Final diagnoses:  [R07.9] Chest pain (Primary)  [R07.9] Chest pain, unspecified type  [R00.0] Wide-complex tachycardia  [I48.91] Atrial fibrillation, unspecified type          ED Disposition Condition    AMA Fair                David Stuart MD  09/10/24 [id number]